# Patient Record
Sex: FEMALE | Race: WHITE
[De-identification: names, ages, dates, MRNs, and addresses within clinical notes are randomized per-mention and may not be internally consistent; named-entity substitution may affect disease eponyms.]

---

## 2018-05-11 NOTE — HP
PREOPERATIVE HISTORY AND PHYSICAL EXAM:

 

DATE OF SURGERY/ADMISSION:  05/25/18.

 

DATE OF OFFICE VISIT/ENCOUNTER:  05/09/18.

 

ATTENDING SURGEON:  Angelita Morse MD.* (DICTATED BY JEFF NUNO)

 

PROCEDURE:  Left wrist de Quervain's release, ganglion cyst excision.

 

CHIEF COMPLAINT:  Left wrist pain, cyst.

 

HISTORY OF PRESENT ILLNESS:  This is a 38-year-old female who works at [employer].  
She has developed pain in her left wrist over the past several months, which 
she attributes to repetitive motion work at [employer]. She works at the cash 
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  She does not recall any 
injury outside of work to the thumb may have contributed to her symptoms.  She 
had a cortisone injection administered by Dr. Ho at [facility].  
However, it was not very helpful.  Since then she has developed a small lump in 
the area of her pain at the tip of the radiostyloid.  She denies any associated 
numbness or tingling.  She has tried using a brace for the tendonitis, but it 
is too bothersome as it places pressure on the cyst.  She is interested in 
pursuing surgical intervention for both of these problems and has consented to 
proceed with a left wrist de Quervain's release and ganglion cyst excision.

 

PAST MEDICAL HISTORY:  Anxiety/depression.

 

PAST SURGICAL HISTORY:  None.

 

MEDICATIONS:

1.  Mirena 52 mg.

2.  Effexor XR daily.

 

ALLERGIES:  No known drug allergies.

 

FAMILY MEDICAL HISTORY:  A history of diabetes and hypertension.

 

SOCIAL HISTORY:  The patient is employed at [employer].  She is a former smoker.  She 
quit in 2004.  Prior to that she smoked approximately 5 cigarettes a day and 
did so for 10 years.  She denies recreational drug use.  She does drink alcohol 
on occasions.

 

REVIEW OF SYSTEMS:  General: Negative for fevers, chills or night sweats. 
Unexplained weight loss and gain.  No know anesthesia problems.  HEENT:  
Negative for headache, lightheadedness, syncopal episodes or visual changes.  
Integumentary: Negative for abrasions, lesions or open wounds.  Cardiothoracic:
  Negative for hypertension, chest pain, palpitations, edema.  Respiratory:  
Negative for shortness of breath with exertion, chronic cough, wheezing.  GI: 
Negative for nausea, vomiting, diarrhea, constipation, or GERD.  :  Negative 
for nocturia, urinary frequency, urgency, history of UTIs and kidney problems. 
Musculoskeletal: Positive for current complaints.  Negative for chronic or 
intermittent back pain or history of fractures.  Neurological:  Negative for 
paresthesias, numbness, history of seizure or stroke, poor balance.  Positive 
for anxiety/depression.  Endocrine: Negative for diabetes and thyroid issues. 
Hematologic: Negative for easy bruising, anemia, bleeding disorders, or DVT.  
Infectious Disease:  Negative for history of MRSA, hepatitis C, HIV.

 

                               PHYSICAL EXAMINATION

 

GENERAL: Well-developed, well-nourished, 38-year-old female in no acute 
distress.

 

VITAL SIGNS: Height 5 feet 4 inches, weight 200 pounds, pulse rate 88, blood 
pressure 130/82.

 

HEENT: Normocephalic, atraumatic.  Pupils are equal, round and reactive to 
light and accommodation.  Extraocular movements are intact.  Throat is clear.

 

NECK: Supple.  No palpable lymph nodes.

 

PULMONARY: Lungs are clear to auscultation bilaterally.  No wheezes, rales or 
rhonchi.

 

CARDIOVASCULAR: Regular rate and rhythm. S1, S2.  No murmurs, rubs or gallops.  
No edema.

 

ABDOMEN: Positive bowel sounds, soft, nontender.

 

MUSCULOSKELETAL: On exam of her left wrist, she has a ganglion cyst at the tip 
of the radiostyloid process, it is tender to palpation.  She has tenderness to 
palpation as well along the first dorsal compartment and a positive Finkelstein'
s test.  She has pain with full wrist flexion and extension.  She can make a 
full fist. Neurovascular function is intact.

 

NEUROLOGICAL: Alert and oriented x3. Cranial nerves II through XII are intact. 
Sensation is intact to light touch.

 

SKIN: Intact.

 

 IMAGING STUDIES:  X-rays, AP, lateral and oblique of the left wrist appear 
normal.

 

IMPRESSION:  De Quervain's tenosynovitis on the left with a ganglion cyst.

 

PLAN:  Patient is scheduled for a left wrist de Quervain's release, ganglion 
cyst excision with Dr. Morse on 05/25/18.  We will plan on using absorbable 
sutures and Steri-Strips as the patient is leaving town approximately 5 days 
after surgery.  We will plan on seeing her back in office prior to that for 
followup.  A prescription for Ultracet was e-scribed to the patient's pharmacy 
for postoperative pain management.

 

 ____________________________________ JEFF NUNO

 

[id number]/[facility] #: [id number]

[facility]

## 2018-05-25 ENCOUNTER — HOSPITAL ENCOUNTER (OUTPATIENT)
Dept: HOSPITAL 25 - OREAST | Age: 38
Discharge: HOME | End: 2018-05-25
Attending: ORTHOPAEDIC SURGERY
Payer: COMMERCIAL

## 2018-05-25 VITALS — SYSTOLIC BLOOD PRESSURE: 113 MMHG | DIASTOLIC BLOOD PRESSURE: 68 MMHG

## 2018-05-25 DIAGNOSIS — F41.8: ICD-10-CM

## 2018-05-25 DIAGNOSIS — M65.4: Primary | ICD-10-CM

## 2018-05-25 DIAGNOSIS — M67.432: ICD-10-CM

## 2018-05-25 DIAGNOSIS — Z87.891: ICD-10-CM

## 2018-05-25 PROCEDURE — 88304 TISSUE EXAM BY PATHOLOGIST: CPT

## 2018-05-25 PROCEDURE — 81025 URINE PREGNANCY TEST: CPT

## 2018-05-26 NOTE — OP
DATE OF OPERATION:  05/25/18 [facility]

 

DATE OF BIRTH:  [birth year]

 

SURGEON:  Angelita Morse MD

 

ASSISTANT:  JEFF Benitez

 

ANESTHESIA:  Local MAC.

 

PRE-OP DIAGNOSIS:  Left de Quervain's tenosynovitis with a ganglion cyst.

 

POST-OP DIAGNOSIS:  Left de Quervain's tenosynovitis with a ganglion cyst.

 

OPERATIVE PROCEDURE:  Left wrist de Quervain's release and ganglion cyst 
excision.

 

ESTIMATED BLOOD LOSS:  Zero.

 

TOURNIQUET TIME:  About 10 minutes.

 

INDICATIONS FOR PROCEDURE:  Desiree is a 38-year-old female with a painful mass 
on the radial styloid of her left wrist, also painful range of motion 
consistent with de Quervain's tenosynovitis.  She presents for de Quervain's 
release and ganglion cyst excision.

 

DESCRIPTION OF PROCEDURE:  The patient was brought to the operating room, was 
given a sedation anesthetic and a local infiltration of 10 cc of 1% plain 
lidocaine centered at the radial styloid of her left wrist.  The skin of her 
left hand and forearm was prepped and draped in the usual sterile fashion.  The 
hand and forearm were exsanguinated and the tourniquet elevated to 250 mmHg.  A 
longitudinal incision was made centered at the tip of the radial styloid.  We 
dissected bluntly through the subcutaneous tissue and branches of the radial 
and sensory nerve were located and retracted by the surgical assistant, Marya Lopes.  The ganglion cyst emanating from the first dorsal compartment sheath 
was excised and sent for Pathology.  The remainder of the sheath was incised, 
completely releasing the APL and EPB tendons, which were in separate 
compartments.  The wound was irrigated and the skin edges reapproximated with 4-
0 nylon suture.  The wound was dressed with Xeroform, 4x4, Webril, and an Ace 
wrap.  The patient tolerated the procedure well and was brought to the recovery 
room in good condition.

 

 [id number]/[facility] #: [id number]

[facility]

## 2019-09-30 ENCOUNTER — HOSPITAL ENCOUNTER (EMERGENCY)
Dept: HOSPITAL 25 - UCEAST | Age: 39
Discharge: HOME HEALTH | End: 2019-09-30
Payer: SELF-PAY

## 2019-09-30 VITALS — SYSTOLIC BLOOD PRESSURE: 111 MMHG | DIASTOLIC BLOOD PRESSURE: 76 MMHG

## 2019-09-30 DIAGNOSIS — R10.13: Primary | ICD-10-CM

## 2019-09-30 DIAGNOSIS — R10.32: ICD-10-CM

## 2019-09-30 DIAGNOSIS — Z87.891: ICD-10-CM

## 2019-09-30 PROCEDURE — 87086 URINE CULTURE/COLONY COUNT: CPT

## 2019-09-30 PROCEDURE — G0463 HOSPITAL OUTPT CLINIC VISIT: HCPCS

## 2019-09-30 PROCEDURE — 99212 OFFICE O/P EST SF 10 MIN: CPT

## 2019-09-30 PROCEDURE — 81003 URINALYSIS AUTO W/O SCOPE: CPT

## 2019-09-30 PROCEDURE — 84702 CHORIONIC GONADOTROPIN TEST: CPT

## 2019-09-30 NOTE — UC
Abdominal Pain Female HPI





- HPI Summary


HPI Summary: 





Ms. Rodrigez started with epigastric pain about 8 AM.  She is not nauseated.  She 

tried to eat something after the pain started was unable as it aggravated the 

pain.  She's been moving bowels and bladder normally and denies pregnancy.  She 

describes the pain as waxing and waning.  She has had something similar one 

other time remotely.





- History of Current Complaint


Chief Complaint: UCAbdominalPain


Stated Complaint: ABDOMINAL COMPLAINT


Time Seen by Provider: 09/30/19 10:50


Hx Obtained From: Patient


Hx Last Menstrual Period: iud in place


Pregnant?: No


Onset/Duration: Sudden Onset


Timing: Constant


Severity Initially: Severe


Severity Currently: Severe


Pain Intensity: 8


Location: Epigastric


Radiates: No


Character: Sharp


Aggravating Factor(s): Nothing


Alleviating Factor(s): Nothing


Allergies/Adverse Reactions: 


 Allergies











Allergy/AdvReac Type Severity Reaction Status Date / Time


 


No Known Allergies Allergy   Verified 09/30/19 10:44











Home Medications: 


 Home Medications





Fluoxetine HCl [Prozac] 1 tab PO DAILY 09/30/19 [History Confirmed 09/30/19]


Levonorgestrel (Iud) [Mirena IUD] 1 unit INTRAUTERI ONCE 09/30/19 [History 

Confirmed 09/30/19]


raNITIdine HCl [Ranitidine HCl] 1 tab PO ONCE PRN 09/30/19 [History Confirmed 09 /30/19]











PMH/Surg Hx/FS Hx/Imm Hx


Previously Healthy: Yes





- Surgical History


Surgical History: Yes


Surgery Procedure, Year, and Place: left wrist surgery





- Social History


Alcohol Use: Weekly


Alcohol Amount: 3-5 per week


Substance Use Type: None


Smoking Status (MU): Former Smoker


Amount Used/How Often: 1/2 pack a day for 10 yrs


When Did the Patient Quit Smoking/Using Tobacco: 2005





Review of Systems


All Other Systems Reviewed And Are Negative: Yes





Physical Exam





- Summary


Physical Exam Summary: 





She is nontoxic in appearance with stable vital signs but in obvious pain.


Triage Information Reviewed: Yes


Appearance: Pain Distress


Vital Signs: 


 Initial Vital Signs











Temp  98.6 F   09/30/19 10:40


 


Pulse  84   09/30/19 10:40


 


Resp  18   09/30/19 10:40


 


BP  108/76   09/30/19 10:40


 


Pulse Ox  100   09/30/19 10:40











Vital Signs Reviewed: Yes


Abdomen Description: Positive: Soft, Other: - She is primarily tender in the 

epigastrium and the left upper quadrant.  Hanson's sign is negative.  Negative: 

Distended, Guarding





Abd Pain Female Course/Dx





- Course


Course Of Treatment: 


She improved significantly with a GI cocktail.  On arrival prior to her signing 

in, I let her know that we are very limited in our evaluation of abdominal pain 

here.  She requested a UA as she has been having some dysuria for quite some 

time.  I recommended that we treat her with a GI cocktail to help with 

diagnosis but that my preference would be that we have her go to the emergency 

department for a more thorough evaluation.  Since she did improve significantly 

with the GI cocktail consisting of viscous lidocaine and Maalox, I recommended 

that it have her use a PPI for couple days.  I still recommended that she go to 

the emergency department for further evaluation but she is not interested.  I 

do not think a gallbladder ultrasound or CAT scan is indicated at this time.





- Differential Dx/Diagnosis


Provider Diagnosis: 


 Epigastric pain








Discharge ED





- Sign-Out/Discharge


Documenting (check all that apply): Patient Departure


All imaging exams completed and their final reports reviewed: No Studies





- Discharge Plan


Condition: Stable


Disposition: HOME-RECOMMEND TO ED


Patient Education Materials:  Epigastric Pain (ED)


Referrals: 


Celina Nelson MD [Primary Care Provider] - 


Additional Instructions: 


I still think that the most appropriate thing would be for you to go to the 

emergency department to get a more thorough evaluation.  Certainly I would 

request that a few if he were to worsen or develop any new symptoms.





- Billing Disposition and Condition


Condition: STABLE


Disposition: Home-Recommend to ED